# Patient Record
Sex: FEMALE | Race: WHITE | NOT HISPANIC OR LATINO | Employment: FULL TIME | URBAN - METROPOLITAN AREA
[De-identification: names, ages, dates, MRNs, and addresses within clinical notes are randomized per-mention and may not be internally consistent; named-entity substitution may affect disease eponyms.]

---

## 2017-08-14 ENCOUNTER — OFFICE VISIT (OUTPATIENT)
Dept: DERMATOLOGY | Facility: CLINIC | Age: 32
End: 2017-08-14
Payer: COMMERCIAL

## 2017-08-14 DIAGNOSIS — D22.9 MULTIPLE NEVI: Primary | ICD-10-CM

## 2017-08-14 DIAGNOSIS — D18.00 ANGIOMA: ICD-10-CM

## 2017-08-14 DIAGNOSIS — L82.1 SEBORRHEIC KERATOSIS: ICD-10-CM

## 2017-08-14 PROCEDURE — 99203 OFFICE O/P NEW LOW 30 MIN: CPT | Mod: S$GLB,,, | Performed by: DERMATOLOGY

## 2017-08-14 PROCEDURE — 99999 PR PBB SHADOW E&M-NEW PATIENT-LVL II: CPT | Mod: PBBFAC,,, | Performed by: DERMATOLOGY

## 2017-08-14 NOTE — PROGRESS NOTES
Subjective:       Patient ID:  Maria Luz Madera is a 31 y.o. female who presents for   Chief Complaint   Patient presents with    Skin Check     FBSE Mole check    Mole     c/o moles on right shoulder thats growing x 2 months     The patient denies personal or family history of skin cancer.    History of Present Illness: The patient presents with chief complaint of moles.  Location: back  Duration: several years  Signs/Symptoms: growing    Prior treatments: none          Review of Systems   Constitutional: Negative for fever and chills.   Gastrointestinal: Negative for nausea and vomiting.   Skin: Positive for daily sunscreen use and activity-related sunscreen use. Negative for recent sunburn.   Hematologic/Lymphatic: Does not bruise/bleed easily.        Objective:    Physical Exam   Constitutional: She appears well-developed and well-nourished. No distress.   Neurological: She is alert and oriented to person, place, and time. She is not disoriented.   Psychiatric: She has a normal mood and affect.   Skin:   Areas Examined (abnormalities noted in diagram):   Scalp / Hair Palpated and Inspected  Head / Face Inspection Performed  Neck Inspection Performed  Chest / Axilla Inspection Performed  Abdomen Inspection Performed  Genitals / Buttocks / Groin Inspection Performed  Back Inspection Performed  RUE Inspected  LUE Inspection Performed  RLE Inspected  LLE Inspection Performed  Nails and Digits Inspection Performed              Diagram Legend     Erythematous scaling macule/papule c/w actinic keratosis       Vascular papule c/w angioma      Pigmented verrucoid papule/plaque c/w seborrheic keratosis      Yellow umbilicated papule c/w sebaceous hyperplasia      Irregularly shaped tan macule c/w lentigo     1-2 mm smooth white papules consistent with Milia      Movable subcutaneous cyst with punctum c/w epidermal inclusion cyst      Subcutaneous movable cyst c/w pilar cyst      Firm pink to brown papule  c/w dermatofibroma      Pedunculated fleshy papule(s) c/w skin tag(s)      Evenly pigmented macule c/w junctional nevus     Mildly variegated pigmented, slightly irregular-bordered macule c/w mildly atypical nevus      Flesh colored to evenly pigmented papule c/w intradermal nevus       Pink pearly papule/plaque c/w basal cell carcinoma      Erythematous hyperkeratotic cursted plaque c/w SCC      Surgical scar with no sign of skin cancer recurrence      Open and closed comedones      Inflammatory papules and pustules      Verrucoid papule consistent consistent with wart     Erythematous eczematous patches and plaques     Dystrophic onycholytic nail with subungual debris c/w onychomycosis     Umbilicated papule    Erythematous-base heme-crusted tan verrucoid plaque consistent with inflamed seborrheic keratosis     Erythematous Silvery Scaling Plaque c/w Psoriasis     See annotation      Assessment / Plan:        Multiple nevi  Reassurance given.  Discussed ABCDEF of melanoma and changes for patient to look for.  AAD Handout given. Discussed importance of daily use of sunscreen.      Angioma  Reassurance given.  Lesions are benign.    Seborrheic keratosis  Reassurance given.  Lesions are benign.             Return if symptoms worsen or fail to improve.

## 2017-08-14 NOTE — PATIENT INSTRUCTIONS
Monitoring Moles  Moles, also called nevi, are small, pigmented (colored) marks on the skin. They have no known purpose. Many moles appear before age 30, but they also increase frequently as people age. Moles most often are benign (not cancer) and harmless. But some become cancerous. Thats why you need to watch the moles on your body and tell your health care provider about any concern you.    What are moles?  Moles are a type of pigmented samantha. Freckles, which often are sprinkled across the bridge of the nose, the cheeks, and the arms, are another type of pigmented samantha. Moles can appear on any part of the body. There are many types, sizes, and shapes of moles. Most moles are solid brown. In most cases, they are flat or dome-shaped, smooth, and have well-defined edges. Freckles are flat.  Why worry about moles?  Most moles are benign and dont require treatment. You can have moles removed if you dont like the way they look or feel. But moles that appear after you are 30 or that change in certain ways may become a problem. These moles may turn into melanoma, a type of skin cancer. Melanoma is one of the fastest growing cancers in the [place], but it is often curable if caught early. But this disease can be life-threatening, particularly when not diagnosed early. The more moles someone has, the higher the risk. Risk is also higher for those who have had more lifetime exposure to the sun, severe blistering sunburns, exposure to tanning beds, a prior personal history of cancer, and those with a family history of skin cancer. To manage your risk, its smart to check your moles for changes and ask your health care provider to perform a thorough skin exam when you have a physical exam. To do this, you first need to learn where your moles are. Then, be sure to check your moles each month.    Checking your moles  You can check many of your moles each month. You can do this right after you shower and before you get  dressed. Check your body from head to toe. Then, make a list of your moles. If you find any new moles or changes in your moles, call your health care provider. To check your moles, youll need:  · A full-length mirror  · A stool or chair to sit on while you check your feet  If you have a lot of moles, take digital photos of them each month. Make sure to take photos both up close and from a distance. These can help you see if any moles change over time.  When to seek medical treatment  See your health care provider if your moles hurt, itch, ooze, bleed, thicken, become crusty, or show other changes. Also, be sure to call your health care provider if your moles show any of the following signs of melanoma:  · A change in size, shape, color, or elevation  · Asymmetry (when the sides dont match)  · Ragged, notched, or blurred borders  · Varied colors within the same mole  · Size is larger than 5 mm or 6 mm in diameter (the size of a pencil eraser)  © 7221-5753 [employer]. [address] [place] [id number]. All rights reserved. This information is not intended as a substitute for professional medical care. Always follow your healthcare professional's instructions.

## 2019-04-24 ENCOUNTER — TELEPHONE (OUTPATIENT)
Dept: AUDIOLOGY | Facility: CLINIC | Age: 34
End: 2019-04-24

## 2019-04-24 ENCOUNTER — PATIENT MESSAGE (OUTPATIENT)
Dept: AUDIOLOGY | Facility: CLINIC | Age: 34
End: 2019-04-24

## 2019-04-24 NOTE — TELEPHONE ENCOUNTER
Left VM for patient to call back to R/S audio/ENT- she R/S Dr. Nielson's appt but not the audiogram.  No audiology appts are available at the time she R/S.  I also sent In Basket message /Wilshire Axon/

## 2019-04-30 ENCOUNTER — OFFICE VISIT (OUTPATIENT)
Dept: OTOLARYNGOLOGY | Facility: CLINIC | Age: 34
End: 2019-04-30
Payer: COMMERCIAL

## 2019-04-30 ENCOUNTER — CLINICAL SUPPORT (OUTPATIENT)
Dept: AUDIOLOGY | Facility: CLINIC | Age: 34
End: 2019-04-30

## 2019-04-30 VITALS
HEART RATE: 63 BPM | TEMPERATURE: 98 F | SYSTOLIC BLOOD PRESSURE: 106 MMHG | DIASTOLIC BLOOD PRESSURE: 66 MMHG | WEIGHT: 183.19 LBS

## 2019-04-30 DIAGNOSIS — H93.13 TINNITUS, BILATERAL: Primary | ICD-10-CM

## 2019-04-30 PROCEDURE — 99999 PR PBB SHADOW E&M-EST. PATIENT-LVL III: ICD-10-PCS | Mod: PBBFAC,,, | Performed by: ORTHOPAEDIC SURGERY

## 2019-04-30 PROCEDURE — 92557 COMPREHENSIVE HEARING TEST: CPT | Mod: PBBFAC,PN | Performed by: AUDIOLOGIST-HEARING AID FITTER

## 2019-04-30 PROCEDURE — 99999 PR PBB SHADOW E&M-EST. PATIENT-LVL III: CPT | Mod: PBBFAC,,, | Performed by: ORTHOPAEDIC SURGERY

## 2019-04-30 PROCEDURE — 92567 TYMPANOMETRY: CPT | Mod: PBBFAC,PN | Performed by: AUDIOLOGIST-HEARING AID FITTER

## 2019-04-30 PROCEDURE — 99203 OFFICE O/P NEW LOW 30 MIN: CPT | Mod: S$GLB,,, | Performed by: ORTHOPAEDIC SURGERY

## 2019-04-30 PROCEDURE — 99203 PR OFFICE/OUTPT VISIT, NEW, LEVL III, 30-44 MIN: ICD-10-PCS | Mod: S$GLB,,, | Performed by: ORTHOPAEDIC SURGERY

## 2019-04-30 NOTE — PROGRESS NOTES
Referring Provider: Dr. Naga Barga Alyce Madera was seen 04/30/2019 for an audiological evaluation.  Patient complains of tinnitus bilaterally. She reports tinnitus that first started 10 years ago.  She describes the tinnitus as hissing and is nonpulsatile.  The tinnitus is present in both ears.  She has not noted any ear discharge, but she has noted hearing loss.  She has not recently started any new medications and has not had any dietary changes.  She has a grandmother with hearing loss with older age, no family history of hearing loss in childhood.  She did have frequent AOM as a child, but never required PET placement.  She also has noted some issues with dizziness.  She says that it was an illusion of movement as well as a spinning sensation that lasted for about four to six hours at its worst, most were shorter.  That has decreased with a new job and decrease in stress environment.    Results reveal normal hearing in each ear from 250-8000Hz.   Speech Reception Thresholds were  15 dBHL for the right ear and 15 dBHL for the left ear.   Word recognition scores were excellent for the right ear and excellent for the left ear.   Tympanograms were Type A, normal for the right ear and Type A, normal for the left ear.    Patient was counseled on the above findings.    Recommendations:  1.We discussed that tinnitus is most often caused by a hearing loss, and that as the hair cells are damaged, either genetic or as a result of loud noise exposure, they then cause tinnitus. However, her hearing is normal today. Some patients find that restricting the salt or caffeine in their diet helps, and there is also an OTC supplement, lipflavinoids, that some people find to be effective though their benefit is not fully proven. Tinnitus tends to be louder in times of stress and fatigue, and may decrease with time. Sound machines may also be an effective masking technique if needed at night.   2. Annual  Audiograms

## 2019-04-30 NOTE — PROGRESS NOTES
Subjective:       Patient ID: Maria Luz Madera is a 33 y.o. female.    Chief Complaint: Otalgia (on and off ); Otitis Media; and Dizziness    Patient is a very pleasant 33 y.o. female here to see me today for the first time for evaluation of tinnitus.  She reports tinnitus that first started 10 years ago.  She describes the tinnitus as hissing and is nonpulsatile.  The tinnitus is present in both ears.  She has not noted any ear discharge, but she has noted hearing loss.  She has not recently started any new medications and has not had any dietary changes.  She has a grandmother with hearing loss with older age, no family history of hearing loss in childhood.  She did have frequent AOM as a child, but never required PET placement.  She also has noted some issues with dizziness.  She says that it was an illusion of movement as well as a spinning sensation that lasted for about four to six hours at its worst, most were shorter.  That has decreased with a new job and decrease in stress environment.      Review of Systems   Constitutional: Negative for chills, fatigue, fever and unexpected weight change.   HENT: Positive for hearing loss and tinnitus. Negative for congestion, dental problem, ear discharge, ear pain, facial swelling, nosebleeds, postnasal drip, rhinorrhea, sinus pressure, sneezing, sore throat, trouble swallowing and voice change.    Eyes: Negative for redness, itching and visual disturbance.   Respiratory: Negative for cough, choking, shortness of breath and wheezing.    Cardiovascular: Negative for chest pain and palpitations.   Gastrointestinal: Negative for abdominal pain.        No reflux.   Musculoskeletal: Negative for gait problem.   Skin: Negative for rash.   Neurological: Positive for dizziness. Negative for light-headedness.       Objective:      Physical Exam   Constitutional: She is oriented to person, place, and time. She appears well-developed and well-nourished. No distress.    HENT:   Head: Normocephalic and atraumatic.   Right Ear: Tympanic membrane, external ear and ear canal normal.   Left Ear: Tympanic membrane, external ear and ear canal normal.   Nose: Septal deviation (to the right, slight) present. No mucosal edema, rhinorrhea or nasal deformity. No epistaxis. Right sinus exhibits no maxillary sinus tenderness and no frontal sinus tenderness. Left sinus exhibits no maxillary sinus tenderness and no frontal sinus tenderness.   Mouth/Throat: Uvula is midline, oropharynx is clear and moist and mucous membranes are normal. Mucous membranes are not pale and not dry. No dental caries. No oropharyngeal exudate or posterior oropharyngeal erythema. Tonsillar exudate: tonsil stone on superior pole of right tonsil.   Eyes: Pupils are equal, round, and reactive to light. Conjunctivae, EOM and lids are normal. Right eye exhibits no chemosis. Left eye exhibits no chemosis. Right conjunctiva is not injected. Left conjunctiva is not injected. No scleral icterus. Right eye exhibits normal extraocular motion and no nystagmus. Left eye exhibits normal extraocular motion and no nystagmus.   Neck: Trachea normal and phonation normal. No tracheal tenderness present. No tracheal deviation present. No thyroid mass and no thyromegaly present.   Cardiovascular: Intact distal pulses.   Pulmonary/Chest: Effort normal. No stridor. No respiratory distress.   Abdominal: She exhibits no distension.   Lymphadenopathy:        Head (right side): No submental, no submandibular, no preauricular, no posterior auricular and no occipital adenopathy present.        Head (left side): No submental, no submandibular, no preauricular, no posterior auricular and no occipital adenopathy present.     She has no cervical adenopathy.   Neurological: She is alert and oriented to person, place, and time. No cranial nerve deficit.   Skin: Skin is warm and dry. No rash noted. No erythema.   Psychiatric: She has a normal mood and  affect. Her behavior is normal.        AUDIOGRAM:  Bilateral type A tympanograms, normal hearing bilaterally      Assessment:       1. Tinnitus, bilateral        Plan:       1.  Bilateral tinnitus:  Reassured patient that her hearing is normal, no hearing loss.  RTC as needed.